# Patient Record
Sex: FEMALE | Race: WHITE | NOT HISPANIC OR LATINO | Employment: OTHER | URBAN - METROPOLITAN AREA
[De-identification: names, ages, dates, MRNs, and addresses within clinical notes are randomized per-mention and may not be internally consistent; named-entity substitution may affect disease eponyms.]

---

## 2017-09-12 ENCOUNTER — OFFICE VISIT (OUTPATIENT)
Dept: ORTHOPEDIC SURGERY | Facility: CLINIC | Age: 52
End: 2017-09-12

## 2017-09-12 VITALS — HEIGHT: 66 IN | TEMPERATURE: 97.4 F | WEIGHT: 214 LBS | BODY MASS INDEX: 34.39 KG/M2

## 2017-09-12 DIAGNOSIS — M25.561 ACUTE PAIN OF RIGHT KNEE: Primary | ICD-10-CM

## 2017-09-12 PROCEDURE — 99203 OFFICE O/P NEW LOW 30 MIN: CPT | Performed by: NURSE PRACTITIONER

## 2017-09-12 PROCEDURE — 73562 X-RAY EXAM OF KNEE 3: CPT | Performed by: NURSE PRACTITIONER

## 2017-09-12 RX ORDER — MELOXICAM 15 MG/1
TABLET ORAL
Qty: 30 TABLET | Refills: 3 | Status: SHIPPED | OUTPATIENT
Start: 2017-09-12

## 2017-09-12 RX ORDER — METOPROLOL SUCCINATE 50 MG/1
TABLET, EXTENDED RELEASE ORAL
COMMUNITY
Start: 2017-08-22

## 2017-12-18 ENCOUNTER — APPOINTMENT (OUTPATIENT)
Dept: WOMENS IMAGING | Facility: HOSPITAL | Age: 52
End: 2017-12-18

## 2017-12-18 PROCEDURE — 77063 BREAST TOMOSYNTHESIS BI: CPT | Performed by: RADIOLOGY

## 2017-12-18 PROCEDURE — 77067 SCR MAMMO BI INCL CAD: CPT | Performed by: RADIOLOGY

## 2019-01-03 ENCOUNTER — APPOINTMENT (OUTPATIENT)
Dept: WOMENS IMAGING | Facility: HOSPITAL | Age: 54
End: 2019-01-03

## 2019-01-03 PROCEDURE — 77067 SCR MAMMO BI INCL CAD: CPT | Performed by: RADIOLOGY

## 2019-01-03 PROCEDURE — 77063 BREAST TOMOSYNTHESIS BI: CPT | Performed by: RADIOLOGY

## 2020-01-14 ENCOUNTER — APPOINTMENT (OUTPATIENT)
Dept: WOMENS IMAGING | Facility: HOSPITAL | Age: 55
End: 2020-01-14

## 2020-01-14 PROCEDURE — 77063 BREAST TOMOSYNTHESIS BI: CPT | Performed by: RADIOLOGY

## 2020-01-14 PROCEDURE — 77067 SCR MAMMO BI INCL CAD: CPT | Performed by: RADIOLOGY

## 2021-03-23 ENCOUNTER — APPOINTMENT (OUTPATIENT)
Dept: WOMENS IMAGING | Facility: HOSPITAL | Age: 56
End: 2021-03-23

## 2021-03-23 PROCEDURE — 77063 BREAST TOMOSYNTHESIS BI: CPT | Performed by: RADIOLOGY

## 2021-03-23 PROCEDURE — 77067 SCR MAMMO BI INCL CAD: CPT | Performed by: RADIOLOGY

## 2022-05-09 ENCOUNTER — APPOINTMENT (OUTPATIENT)
Dept: WOMENS IMAGING | Facility: HOSPITAL | Age: 57
End: 2022-05-09

## 2022-05-09 PROCEDURE — 77063 BREAST TOMOSYNTHESIS BI: CPT | Performed by: RADIOLOGY

## 2022-05-09 PROCEDURE — 77067 SCR MAMMO BI INCL CAD: CPT | Performed by: RADIOLOGY

## 2025-06-02 ENCOUNTER — OFFICE VISIT (OUTPATIENT)
Dept: ORTHOPEDIC SURGERY | Facility: CLINIC | Age: 60
End: 2025-06-02
Payer: COMMERCIAL

## 2025-06-02 VITALS — WEIGHT: 206 LBS | TEMPERATURE: 96.1 F | HEIGHT: 66 IN | BODY MASS INDEX: 33.11 KG/M2

## 2025-06-02 DIAGNOSIS — R52 PAIN: Primary | ICD-10-CM

## 2025-06-02 DIAGNOSIS — M25.562 ACUTE PAIN OF LEFT KNEE: ICD-10-CM

## 2025-06-02 RX ORDER — MELOXICAM 15 MG/1
15 TABLET ORAL DAILY PRN
Qty: 30 TABLET | Refills: 3 | Status: SHIPPED | OUTPATIENT
Start: 2025-06-02

## 2025-06-02 NOTE — PROGRESS NOTES
"Patient: Rhina Stubbs  YOB: 1965 59 y.o. female  Medical Record Number: [id number]    Chief Complaints:   Chief Complaint   Patient presents with    Left Knee - Pain, Initial Evaluation       History of Present Illness:Rhina Stubbs is a 59 y.o. female who presents as a new patient both myself as well as to the practice and since been well over 3 years since she was seen last.  Patient is here today with complaints of left knee and leg pain.  She reports this started approximately 2 months ago according to the patient.  Denies any specific injury.  She reports she has no pain at all with rest.  When she gets up and starts moving around she has pain along the medial aspect of her left knee as well as the posterior aspect at times she has pain going down her leg.  She also reports at night she has a difficult time finding a comfortable position in bed because of pain going down the leg.    Allergies:   Allergies   Allergen Reactions    Amoxicillin Rash       Medications:   Current Outpatient Medications   Medication Sig Dispense Refill    metoprolol succinate XL (TOPROL-XL) 50 MG 24 hr tablet       meloxicam (Mobic) 15 MG tablet Take 1 tablet by mouth Daily As Needed for Moderate Pain. 30 tablet 3     No current facility-administered medications for this visit.         The following portions of the patient's history were reviewed and updated as appropriate: allergies, current medications, past family history, past medical history, past social history, past surgical history and problem list.    Review of Systems:   14 point review of systems was performed. All systems negative except pertinent positives/negatives listed in HPI above    Physical Exam:   Vitals:    06/02/25 1304   Temp: 96.1 °F (35.6 °C)   Weight: 93.4 kg (206 lb)   Height: 167.6 cm (66\")   PainSc: 7    PainLoc: Knee       General: A and O x 3, ASA, NAD    Skin clear no unusual lesions noted  Left knee the patient has no appreciable " effusion 120 degrees flexion neutral in extension positive medial Francis negative Lockman calf soft and nontender she has pain with internal extra rotation of the left hip as well with a negative Stinchfield negative logroll      Radiology:  Xrays 3views (ap,lateral, sunrise) left knee 2 views of the left hip were ordered and reviewed today secondary to pain the patient has minimal medial narrowing noted left knee as well as very mild arthritic changes noted the left hip she does have significant arthritic changes noted of the lower lumbar spine.  No comparative views available    Assessment/Plan: Left knee pain-worsening  ?  Referred low back pain    Patient and I discussed options, would definitely recommend an MRI of the left knee to rule out meniscus tear.  Unfortunately she may have more than 1 issue and that she might be having some symptoms also related to her lower back.  We discussed possibly having her see Rocío Bishop, patient declines referral at this time.  We will try changing her from ibuprofen to Mobic to see if that helps at all once we get the results back from the MRI of her knee we will contact patient regarding additional treatment options      Neisha Hurtado, APRN  6/2/2025

## 2025-07-21 ENCOUNTER — HOSPITAL ENCOUNTER (OUTPATIENT)
Facility: HOSPITAL | Age: 60
Discharge: HOME | End: 2025-07-21
Admitting: NURSE PRACTITIONER
Payer: COMMERCIAL

## 2025-07-21 DIAGNOSIS — M25.562 ACUTE PAIN OF LEFT KNEE: ICD-10-CM

## 2025-07-21 PROCEDURE — 73721 MRI JNT OF LWR EXTRE W/O DYE: CPT
